# Patient Record
Sex: MALE | URBAN - NONMETROPOLITAN AREA
[De-identification: names, ages, dates, MRNs, and addresses within clinical notes are randomized per-mention and may not be internally consistent; named-entity substitution may affect disease eponyms.]

---

## 2020-12-23 ENCOUNTER — IMPORTED ENCOUNTER (OUTPATIENT)
Dept: URBAN - NONMETROPOLITAN AREA CLINIC 1 | Facility: CLINIC | Age: 50
End: 2020-12-23

## 2020-12-23 PROBLEM — H25.013: Noted: 2020-12-23

## 2020-12-23 PROBLEM — H52.4: Noted: 2020-12-23

## 2020-12-23 PROCEDURE — S0620 ROUTINE OPHTHALMOLOGICAL EXA: HCPCS

## 2020-12-23 NOTE — PATIENT DISCUSSION
Astigmatism OD/ Hyperopia OU / Presbyopia OU - Discussed diagnosis in detail with patient- New Glasses RX given today- Continue to monitor- RTC 1 year complete Cataracts OU- Discussed diagnosis in detail with patient- Discussed signs and symptoms of progression- Discussed UV protection- No treatment needed at this time- Continue to monitor

## 2022-04-09 ASSESSMENT — VISUAL ACUITY
OS_CC: 20/20
OU_SC: 20/40-2
OD_CC: 20/20

## 2022-04-09 ASSESSMENT — TONOMETRY
OS_IOP_MMHG: 18
OD_IOP_MMHG: 18